# Patient Record
Sex: MALE | Race: BLACK OR AFRICAN AMERICAN | NOT HISPANIC OR LATINO | Employment: FULL TIME | URBAN - NONMETROPOLITAN AREA
[De-identification: names, ages, dates, MRNs, and addresses within clinical notes are randomized per-mention and may not be internally consistent; named-entity substitution may affect disease eponyms.]

---

## 2020-01-21 DIAGNOSIS — R55 SYNCOPE AND COLLAPSE: Primary | ICD-10-CM

## 2020-01-22 ENCOUNTER — OFFICE VISIT (OUTPATIENT)
Dept: CARDIOLOGY | Facility: CLINIC | Age: 25
End: 2020-01-22

## 2020-01-22 VITALS
DIASTOLIC BLOOD PRESSURE: 75 MMHG | SYSTOLIC BLOOD PRESSURE: 120 MMHG | HEART RATE: 78 BPM | BODY MASS INDEX: 26.91 KG/M2 | WEIGHT: 192.2 LBS | OXYGEN SATURATION: 98 % | HEIGHT: 71 IN

## 2020-01-22 DIAGNOSIS — R55 SYNCOPE AND COLLAPSE: Primary | ICD-10-CM

## 2020-01-22 PROCEDURE — 99244 OFF/OP CNSLTJ NEW/EST MOD 40: CPT | Performed by: INTERNAL MEDICINE

## 2020-01-22 PROCEDURE — 93000 ELECTROCARDIOGRAM COMPLETE: CPT | Performed by: INTERNAL MEDICINE

## 2020-01-22 NOTE — PROGRESS NOTES
Fred Malagon  24 y.o. male    01/22/2020  1. Syncope and collapse        History of Present Illness  Fred Malagon is a 24-year-old male who was referred by , neurologist in [place] for evaluation of 2 episodes of syncope.  The patient on 16 November 2019, experienced an episode of syncope when he passed out when he got up to go to the bathroom.  The duration of unconsciousness/semi-consciousness is unclear but he remembers waking up in the ER at [facility].  Further work-up apparently did not show any significant abnormalities and CT scan of the head was reportedly negative.  The patient had no seizure activities but apparently felt limp after the episode.  The patient was in shelter when this happened.  There was a second episode in the first week of December when he felt dizzy but did not actually pass out.  There is no previous history of palpitation, dizziness or syncope in the past and he denies any heart murmurs, congenital heart disease or valvular heart disease.  There is no history of hypertension or diabetes mellitus.  He has no history of seizures.  He is on no medications.  The patient does smoke about a pack of cigarettes for about 10 years and does use amphetamines.  He last used amphetamines on 8 November 2019.    EKG showed sinus rhythm with heart rate of 70 bpm, rightward axis.  Early repolarization.  SUBJECTIVE    No Known Allergies      Past Medical History:   Diagnosis Date   • Change in multiple pigmented skin lesions 05/12/2016    Furunculosis of skin AND/OR subcutaneous tissue - Multiple lesions--LLE, buttocks, Abdomen            History reviewed. No pertinent surgical history.      Family History   Problem Relation Age of Onset   • Heart attack Mother    • Hypertension Mother    • Diabetes Mother    • Heart attack Father    • Hypertension Father    • Diabetes Father    • Hodgkin's lymphoma Brother          Social History     Socioeconomic History   •  "Marital status: Single     Spouse name: Not on file   • Number of children: Not on file   • Years of education: Not on file   • Highest education level: Not on file   Tobacco Use   • Smoking status: Current Every Day Smoker     Packs/day: 1.00     Types: Cigarettes   • Smokeless tobacco: Never Used   Substance and Sexual Activity   • Alcohol use: Never     Frequency: Never   • Drug use: Not Currently     Types: Amphetamines   • Sexual activity: Defer         No current outpatient medications on file.     No current facility-administered medications for this visit.          OBJECTIVE    /75   Pulse 78   Ht 180.3 cm (71\")   Wt 87.2 kg (192 lb 3.2 oz)   SpO2 98%   BMI 26.81 kg/m²         Review of Systems     Constitutional:  Denies recent weight loss, weight gain, fever or chills, no change in exercise tolerance     HENT:  Denies any hearing loss, epistaxis, hoarseness, or difficulty speaking.     Eyes: Wears eyeglasses or contact lenses     Respiratory:  Denies dyspnea with exertion,no cough, wheezing, or hemoptysis.     Cardiovascular: Negative for palpations, orthopnea, PND, peripheral edema, syncope, or claudication. ?  Sharp chest pains from time to time    Gastrointestinal:  Denies change in bowel habits, dyspepsia, ulcer disease, hematochezia, or melena.     Endocrine: Negative for cold intolerance, heat intolerance, polydipsia, polyphagia and polyuria. Denies any history of weight change, heat/cold intolerance, polydipsia, polyuria     Genitourinary: Negative.      Musculoskeletal: Denies any history of arthritic symptoms or back problems     Skin:  Denies any change in hair or nails, rashes, or skin lesions.     Allergic/Immunologic: Negative.  Negative for environmental allergies, food allergies and immunocompromised state.     Neurological: See HPI    Hematological: Denies any food allergies, seasonal allergies, bleeding disorders, or lymphadenopathy.     Psychiatric/Behavioral: Denies any " history of depression, substance abuse, or change in cognitive function.         Physical Exam     Constitutional: Cooperative, alert and oriented, well-developed, well-nourished, in no acute distress.     HENT:   Head: Normocephalic, normal hair patterns, no masses or tenderness.  Ears, Nose, and Throat: No gross abnormalities. No pallor or cyanosis. Dentition good.   Eyes: EOMS intact, PERRL, conjunctivae and lids unremarkable. Fundoscopic exam and visual fields not performed.   Neck: No palpable masses or adenopathy, no thyromegaly, no JVD, carotid pulses are full and equal bilaterally and without  Bruits.     Cardiovascular: Regular rhythm, S1 and S2 normal, no S3 or S4.  No murmurs, gallops, or rubs detected.     Pulmonary/Chest: Chest: normal symmetry, normal respiratory excursion, no intercostal retraction, no use of accessory muscles.            Pulmonary: Normal breath sounds. No rales or ronchi.    Abdominal: Abdomen soft, bowel sounds normoactive, no masses, no hepatosplenomegaly, non-tender, no bruits.     Musculoskeletal: No deformities, clubbing, cyanosis, erythema, or edema observed. There are no spinal abnormalities noted. Normal muscle strength and tone. Pulses full and equal in all extremities, no bruits auscultated.     Neurological: No gross motor or sensory deficits noted, affect appropriate, oriented to time, person, place.     Skin: Warm and dry to the touch, no apparent skin lesions or masses noted.     Psychiatric: He has a normal mood and affect. His behavior is normal. Judgment and thought content normal.         Procedures      No results found for: WBC, HGB, HCT, MCV, PLT  No results found for: GLUCOSE, BUN, CREATININE, EGFRIFNONA, EGFRIFAFRI, BCR, CO2, CALCIUM, PROTENTOTREF, ALBUMIN, LABIL2, AST, ALT  No results found for: CHOL  No results found for: TRIG  No results found for: HDL  No components found for: LDLCALC  No results found for: LDL  No results found for: HDLLDLRATIO  No  components found for: CHOLHDL  No results found for: HGBA1C  No results found for: TSH, E7MVUJL, Q7YCSLN, THYROIDAB        ASSESSMENT AND PLAN  Elver Malagon is a 24-year-old male who was presented with 2 episodes of syncope as described in the history of present illness with no previous history of heart disease.  No arrhythmias have been documented thus far.  Based on the presentation, neurocardiogenic syncope is a consideration.  Seizure activity cannot entirely be ruled out.  I note that the prolactin levels were elevated at [facility].  The etiology of this is unclear.  MRI of the brain has not yet been performed.  The plan would be to proceed with an echocardiogram to assess left ventricular and valvular function and event monitor for 21 days has been arranged to make sure that there are no cardiac arrhythmias.  A tilt table testing to rule out neurocardiogenic syncope has been arranged.  I have encouraged him to drink plenty of fluids up to 60 to 70 ounces per day and he has been advised compression stockings with the hope that this will improve venous tone.  Further recommendations will follow.  Risk factor modification including smoking cessation and abstinence from drug use has been recommended.  Thank you for asked me to see this patient.    Fred was seen today for loss of consciousness.    Diagnoses and all orders for this visit:    Syncope and collapse  -     Adult Transthoracic Echo Complete W/ Cont if Necessary Per Protocol; Future  -     Holter Monitor - 72 Hour Up To 21 Days; Future  -     Tilt Table; Future        Patient's Body mass index is 26.81 kg/m². BMI is above normal parameters. Recommendations include: exercise counseling and nutrition counseling.      Fred Malagon  reports that he has been smoking cigarettes. He has been smoking about 1.00 pack per day. He has never used smokeless tobacco.. I have educated him on the risk of diseases from using tobacco products such as  cancer, COPD and heart diease.     I spent 3  minutes counseling the patient.           Abe Butler MD  1/22/2020  8:37 AM

## 2020-04-09 ENCOUNTER — TELEPHONE (OUTPATIENT)
Dept: CARDIOLOGY | Facility: CLINIC | Age: 25
End: 2020-04-09

## 2020-04-09 NOTE — TELEPHONE ENCOUNTER
Talked to pt about his appointment on 04/13/2020. He stated he would like to cancel it and if he needs to return he will call the office.